# Patient Record
Sex: FEMALE | Race: WHITE | NOT HISPANIC OR LATINO | URBAN - METROPOLITAN AREA
[De-identification: names, ages, dates, MRNs, and addresses within clinical notes are randomized per-mention and may not be internally consistent; named-entity substitution may affect disease eponyms.]

---

## 2019-08-04 ENCOUNTER — EMERGENCY (EMERGENCY)
Facility: HOSPITAL | Age: 19
LOS: 0 days | Discharge: HOME | End: 2019-08-04
Attending: EMERGENCY MEDICINE | Admitting: EMERGENCY MEDICINE
Payer: COMMERCIAL

## 2019-08-04 VITALS
SYSTOLIC BLOOD PRESSURE: 141 MMHG | HEART RATE: 70 BPM | DIASTOLIC BLOOD PRESSURE: 62 MMHG | RESPIRATION RATE: 18 BRPM | OXYGEN SATURATION: 98 % | TEMPERATURE: 97 F
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
## 2019-08-04 DIAGNOSIS — M25.579 PAIN IN UNSPECIFIED ANKLE AND JOINTS OF UNSPECIFIED FOOT: ICD-10-CM

## 2019-08-04 DIAGNOSIS — S93.402A SPRAIN OF UNSPECIFIED LIGAMENT OF LEFT ANKLE, INITIAL ENCOUNTER: ICD-10-CM
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
## 2019-08-04 PROCEDURE — 73610 X-RAY EXAM OF ANKLE: CPT | Mod: 26,LT

## 2019-08-04 PROCEDURE — 99283 EMERGENCY DEPT VISIT LOW MDM: CPT | Mod: 25

## 2019-08-04 PROCEDURE — 29515 APPLICATION SHORT LEG SPLINT: CPT

## 2019-08-04 NOTE — ED PROVIDER NOTE - PHYSICAL EXAMINATION
VITALS:  I have reviewed the initial vital signs.  GENERAL: Well-developed, well-nourished, in no acute distress.  HEENT: NC/AT. Mucous membranes moist.  NECK: supple w FROM.   CARDIO: 2+ pedal pulses bilaterally.  PULM: Normal effort. No tachypnea or retractions.  MSK: +swelling to left lateral malleolus with ttp posteriorly. remainder of foot/ankle/toes nonttp. no tib/fib ttp. ROM to ankle limited 2/2 pain. FROM to knees and toes b/l.  SKIN: Warm, dry. Capillary refill to toes <2 seconds.  NEURO: A&Ox3. Speech clear. 5/5 strength to lower extremities b/l. Sensation intact and equal throughout.

## 2019-08-04 NOTE — ED PROVIDER NOTE - NSFOLLOWUPINSTRUCTIONS_ED_ALL_ED_FT
Additional Anesthesia Volume In Cc (Will Not Render If 0): 0 Sprain    A sprain is a stretch or tear in one of the tough, fiber-like tissues (ligaments) in your body. This is caused by an injury to the area such as a twisting mechanism. Symptoms include pain, swelling, or bruising. Rest that area over the next several days and slowly resume activity when tolerated. Ice can help with swelling and pain.     SEEK IMMEDIATE MEDICAL CARE IF YOU HAVE ANY OF THE FOLLOWING SYMPTOMS: worsening pain, inability to move that body part, numbness or tingling.

## 2019-08-04 NOTE — ED PROVIDER NOTE - CARE PROVIDER_API CALL
Micah Michael (MD)  Orthopaedic Surgery  [address] [place] [address]  Phone: [phone]  Fax: [phone]  Follow Up Time: 1-3 Days

## 2019-08-04 NOTE — ED PROVIDER NOTE - ATTENDING CONTRIBUTION TO CARE
19 year old female, no pmhx, presenting with left ankle pain s/p mechanical fall yesterday. States she was walking down the stairs and she missed a step, causing her to roll her ankle inward. Denies fall, head trauma, LOC, or any other injuries. States she is having pain in the ankle described as achy, non-radiating, worse with movement, relieved with rest, mild severity.  Patient has been able to ambulate since the incident.    Vital Signs: I have reviewed the initial vital signs.  Constitutional: NAD, well-nourished, appears stated age, no acute distress.  HEENT: Airway patent, moist MM, no erythema/swelling/deformity of oral structures. EOMI, PERRLA.  CV: regular rate, regular rhythm, well-perfused extremities, 2+ b/l DP and radial pulses equal.  Lungs: BCTA, no increased WOB.  ABD: NTND, no guarding or rebound, no pulsatile mass, no hernias.   MSK: Neck supple, nontender, nl ROM, no stepoff. Chest nontender. Back nontender in TLS spine or to b/l bony structures or flanks. Ext nontender, nl rom, no deformity.   INTEG: Skin warm, dry, no rash.  NEURO: A&Ox3, normal strength, nl sensation throughout, normal speech.   PSYCH: Calm, cooperative, normal affect and interaction.    Full ROM ankle. Will xray ankle, re-eval.

## 2019-08-04 NOTE — ED PROVIDER NOTE - CLINICAL SUMMARY MEDICAL DECISION MAKING FREE TEXT BOX
Patient presented s/p twisting her ankle. No other traumatic injury, fully neurovascular intact, full ROM ankle. Patient able to ambulate on ankle. Xray of ankle negative. Patient otherwise well appearing, no other complaints. Will put in splint for comfort and give outpatient ortho follow up. Patient agreeable with plan. Agrees to return to ED for any new or worsening symptoms.

## 2019-08-04 NOTE — ED PROVIDER NOTE - OBJECTIVE STATEMENT
19 year old female w no significant pmhx presents to the ED for evaluation of constant, non-radiating, left ankle pain s/p ground level mechanical fall yesterday around 5:00 PM. Patient states she was walking down the stairs after riding a roller coaster when she missed a step and rolled her ankle inward. Denies head injury/LOC. Has been ambulatory since the fall but with pain. Woke up this morning unable to bear weight on ankle and with increased swelling. Denies previous injury to this extremity, paresthesias, numbness, weakness.

## 2019-08-04 NOTE — ED PROVIDER NOTE - NS ED ROS FT
CONSTITUTIONAL: (-) fevers, (-) chills  GI: (-) nausea, (-) vomiting  MSK: see HPI  NEURO: (-) headache, (-) head injury, (-) LOC, (-) numbness, (-) weakness, (-) paresthesias    *all other systems negative except as documented above and in the HPI*

## 2019-08-04 NOTE — ED ADULT NURSE NOTE - NSSUHOSCREENINGYN_ED_ALL_ED
Date: 6/16/2023    Patient Name: Annel Nava          To Whom it may concern:      Nicole Menendez has been a regular patient of mine since 2001. This letter is regarding her competence to care for her financial affairs. Juliette Ca has several medical issues including chronic kidney disease, diabetes, dementia and heart disease. I do not feel that Juliette Ca is capable to care for her financial affairs.        Sincerely,        Idanai Phillips MD
Yes - the patient is able to be screened

## 2023-10-27 PROBLEM — Z78.9 OTHER SPECIFIED HEALTH STATUS: Chronic | Status: ACTIVE | Noted: 2019-08-04

## 2024-01-23 ENCOUNTER — APPOINTMENT (OUTPATIENT)
Dept: PULMONOLOGY | Facility: CLINIC | Age: 24
End: 2024-01-23
Payer: COMMERCIAL

## 2024-01-23 VITALS
WEIGHT: 198 LBS | BODY MASS INDEX: 32.99 KG/M2 | HEIGHT: 65 IN | OXYGEN SATURATION: 98 % | HEART RATE: 86 BPM | SYSTOLIC BLOOD PRESSURE: 124 MMHG | DIASTOLIC BLOOD PRESSURE: 66 MMHG

## 2024-01-23 DIAGNOSIS — Z78.9 OTHER SPECIFIED HEALTH STATUS: ICD-10-CM

## 2024-01-23 DIAGNOSIS — Z83.3 FAMILY HISTORY OF DIABETES MELLITUS: ICD-10-CM

## 2024-01-23 DIAGNOSIS — G47.33 OBSTRUCTIVE SLEEP APNEA (ADULT) (PEDIATRIC): ICD-10-CM

## 2024-01-23 PROBLEM — Z00.00 ENCOUNTER FOR PREVENTIVE HEALTH EXAMINATION: Status: ACTIVE | Noted: 2024-01-23

## 2024-01-23 PROCEDURE — 99204 OFFICE O/P NEW MOD 45 MIN: CPT

## 2024-01-23 RX ORDER — ALBUTEROL SULFATE 90 UG/1
108 (90 BASE) INHALANT RESPIRATORY (INHALATION)
Qty: 1 | Refills: 3 | Status: ACTIVE | COMMUNITY
Start: 2024-01-23 | End: 1900-01-01

## 2024-01-23 NOTE — REASON FOR VISIT
[Initial] : an initial visit [Asthma] : asthma [Sleep Apnea] : sleep apnea [TextEntry] : This is a 23-year-old female presented today with her mother for evaluation for asthma and sleep apnea.  Patient obese with BMI 32 reported that been complaining on and off of daytime fatigue some somnolence also interrupted sleep at night wake up multiple times she is not sure if she snores or not said she had a sleep study before but was not conclusive because it did not record.  Also she reported like 6 months ago she started to complain of wheezing and chest tightness after she do exercise outside, symptoms did not improve after she took her father albuterol.  Currently for the last 2 months she has been doing good but she is not exercising outdoor she is doing some exercise indoor such as treadmill without any problem no cough wheeze or shortness of breath

## 2024-01-23 NOTE — PLAN
[TextEntry] : will proceed with sleep study  counseled for weight reduction  told not to drive if feel tired  counseled for sleep hygiene PFt  asthma profile,cbc.[email or web address]  albuterol as needed and told to try 15 mon prior to exercise

## 2024-01-23 NOTE — HISTORY OF PRESENT ILLNESS
[Initial Evaluation] : an initial evaluation of [Snoring] : snoring [Unrefreshing Sleep] : unrefreshing sleep [Sleepy When Sedentary] : sleepy when sedentary [Currently Experiencing] : The patient is currently experiencing symptoms. [Obesity] : obesity [Asthma] : asthma

## 2024-01-26 ENCOUNTER — OUTPATIENT (OUTPATIENT)
Dept: OUTPATIENT SERVICES | Facility: HOSPITAL | Age: 24
LOS: 1 days | Discharge: HOME | End: 2024-01-26
Payer: COMMERCIAL

## 2024-01-26 ENCOUNTER — APPOINTMENT (OUTPATIENT)
Dept: SLEEP CENTER | Facility: HOSPITAL | Age: 24
End: 2024-01-26
Payer: COMMERCIAL

## 2024-01-26 DIAGNOSIS — G47.33 OBSTRUCTIVE SLEEP APNEA (ADULT) (PEDIATRIC): ICD-10-CM

## 2024-01-26 PROCEDURE — 95800 SLP STDY UNATTENDED: CPT

## 2024-01-26 PROCEDURE — 95800 SLP STDY UNATTENDED: CPT | Mod: 26

## 2024-01-30 DIAGNOSIS — G47.33 OBSTRUCTIVE SLEEP APNEA (ADULT) (PEDIATRIC): ICD-10-CM

## 2024-01-31 ENCOUNTER — APPOINTMENT (OUTPATIENT)
Dept: PULMONOLOGY | Facility: HOSPITAL | Age: 24
End: 2024-01-31
Payer: COMMERCIAL

## 2024-01-31 ENCOUNTER — OUTPATIENT (OUTPATIENT)
Dept: OUTPATIENT SERVICES | Facility: HOSPITAL | Age: 24
LOS: 1 days | End: 2024-01-31
Payer: COMMERCIAL

## 2024-01-31 DIAGNOSIS — G47.33 OBSTRUCTIVE SLEEP APNEA (ADULT) (PEDIATRIC): ICD-10-CM

## 2024-01-31 DIAGNOSIS — R06.02 SHORTNESS OF BREATH: ICD-10-CM

## 2024-01-31 PROCEDURE — 94727 GAS DIL/WSHOT DETER LNG VOL: CPT | Mod: 26

## 2024-01-31 PROCEDURE — 94664 DEMO&/EVAL PT USE INHALER: CPT

## 2024-01-31 PROCEDURE — 94729 DIFFUSING CAPACITY: CPT | Mod: 26

## 2024-01-31 PROCEDURE — 94060 EVALUATION OF WHEEZING: CPT | Mod: 26

## 2024-01-31 PROCEDURE — 94727 GAS DIL/WSHOT DETER LNG VOL: CPT

## 2024-01-31 PROCEDURE — 94070 EVALUATION OF WHEEZING: CPT

## 2024-01-31 PROCEDURE — 94729 DIFFUSING CAPACITY: CPT

## 2024-02-01 DIAGNOSIS — R06.02 SHORTNESS OF BREATH: ICD-10-CM

## 2024-02-01 DIAGNOSIS — G47.33 OBSTRUCTIVE SLEEP APNEA (ADULT) (PEDIATRIC): ICD-10-CM

## 2024-04-02 ENCOUNTER — TRANSCRIPTION ENCOUNTER (OUTPATIENT)
Age: 24
End: 2024-04-02

## 2024-04-02 ENCOUNTER — APPOINTMENT (OUTPATIENT)
Dept: PULMONOLOGY | Facility: CLINIC | Age: 24
End: 2024-04-02
Payer: COMMERCIAL

## 2024-04-02 VITALS
HEIGHT: 65 IN | BODY MASS INDEX: 33.32 KG/M2 | OXYGEN SATURATION: 98 % | WEIGHT: 200 LBS | DIASTOLIC BLOOD PRESSURE: 70 MMHG | SYSTOLIC BLOOD PRESSURE: 125 MMHG | HEART RATE: 85 BPM

## 2024-04-02 DIAGNOSIS — J45.20 MILD INTERMITTENT ASTHMA, UNCOMPLICATED: ICD-10-CM

## 2024-04-02 PROCEDURE — 99213 OFFICE O/P EST LOW 20 MIN: CPT

## 2024-04-02 PROCEDURE — G2211 COMPLEX E/M VISIT ADD ON: CPT

## 2024-04-02 NOTE — HISTORY OF PRESENT ILLNESS
[TextBox_4] : Patient coming for follow-up overall she feels much better she said the albuterol did help with exercise she been using it 1 to twice a week when she do exercise and she is not having any shortness of breath after exercising  when she use the albuterol  chest x-ray reviewed normal Status post home sleep study was negative only go with snoring result discussed with the patient patient currently denies any daytime somnolence fatigue Been trying to lose weight

## 2024-04-02 NOTE — PLAN
[TextEntry] : Albuterol as needed told her she needed more frequent leg more than to 3 times a day week to call me Also has some exercise-induced asthma Counseled for weight reduction Home study was negative patient currently denied daytime somnolence or fatigue we will hold on doing this sleep study in the sleep lab

## 2024-10-29 ENCOUNTER — APPOINTMENT (OUTPATIENT)
Dept: PULMONOLOGY | Facility: CLINIC | Age: 24
End: 2024-10-29

## 2025-07-18 NOTE — ED ADULT NURSE NOTE - CHIEF COMPLAINT QUOTE
[facility] aylin     
c/o left ankle injury